# Patient Record
Sex: MALE | Race: OTHER | NOT HISPANIC OR LATINO | URBAN - METROPOLITAN AREA
[De-identification: names, ages, dates, MRNs, and addresses within clinical notes are randomized per-mention and may not be internally consistent; named-entity substitution may affect disease eponyms.]

---

## 2020-11-23 ENCOUNTER — INPATIENT (INPATIENT)
Facility: HOSPITAL | Age: 60
LOS: 0 days | Discharge: LEFT BEFORE DISCHARGE | DRG: 432 | End: 2020-11-24
Attending: STUDENT IN AN ORGANIZED HEALTH CARE EDUCATION/TRAINING PROGRAM | Admitting: HOSPITALIST
Payer: MEDICARE

## 2020-11-23 VITALS
WEIGHT: 169.98 LBS | TEMPERATURE: 98 F | OXYGEN SATURATION: 100 % | HEART RATE: 108 BPM | SYSTOLIC BLOOD PRESSURE: 130 MMHG | DIASTOLIC BLOOD PRESSURE: 56 MMHG | HEIGHT: 66 IN | RESPIRATION RATE: 17 BRPM

## 2020-11-23 DIAGNOSIS — I81 PORTAL VEIN THROMBOSIS: ICD-10-CM

## 2020-11-23 DIAGNOSIS — R16.0 HEPATOMEGALY, NOT ELSEWHERE CLASSIFIED: ICD-10-CM

## 2020-11-23 DIAGNOSIS — I10 ESSENTIAL (PRIMARY) HYPERTENSION: ICD-10-CM

## 2020-11-23 DIAGNOSIS — K70.31 ALCOHOLIC CIRRHOSIS OF LIVER WITH ASCITES: ICD-10-CM

## 2020-11-23 DIAGNOSIS — D64.9 ANEMIA, UNSPECIFIED: ICD-10-CM

## 2020-11-23 DIAGNOSIS — Z29.9 ENCOUNTER FOR PROPHYLACTIC MEASURES, UNSPECIFIED: ICD-10-CM

## 2020-11-23 LAB
ALBUMIN SERPL ELPH-MCNC: 2.8 G/DL — LOW (ref 3.3–5)
ALP SERPL-CCNC: 159 U/L — HIGH (ref 40–120)
ALT FLD-CCNC: 47 U/L — HIGH (ref 10–45)
AMMONIA BLD-MCNC: <10 UMOL/L — LOW (ref 11–55)
ANION GAP SERPL CALC-SCNC: 9 MMOL/L — SIGNIFICANT CHANGE UP (ref 5–17)
APPEARANCE UR: CLEAR — SIGNIFICANT CHANGE UP
APTT BLD: 30.4 SEC — SIGNIFICANT CHANGE UP (ref 27.5–35.5)
AST SERPL-CCNC: 111 U/L — HIGH (ref 10–40)
BACTERIA # UR AUTO: PRESENT /HPF
BASOPHILS # BLD AUTO: 0.02 K/UL — SIGNIFICANT CHANGE UP (ref 0–0.2)
BASOPHILS NFR BLD AUTO: 0.5 % — SIGNIFICANT CHANGE UP (ref 0–2)
BILIRUB SERPL-MCNC: 1.7 MG/DL — HIGH (ref 0.2–1.2)
BILIRUB UR-MCNC: ABNORMAL
BUN SERPL-MCNC: 14 MG/DL — SIGNIFICANT CHANGE UP (ref 7–23)
CALCIUM SERPL-MCNC: 8.3 MG/DL — LOW (ref 8.4–10.5)
CHLORIDE SERPL-SCNC: 100 MMOL/L — SIGNIFICANT CHANGE UP (ref 96–108)
CO2 SERPL-SCNC: 27 MMOL/L — SIGNIFICANT CHANGE UP (ref 22–31)
COLOR SPEC: YELLOW — SIGNIFICANT CHANGE UP
CREAT SERPL-MCNC: 0.75 MG/DL — SIGNIFICANT CHANGE UP (ref 0.5–1.3)
DIFF PNL FLD: NEGATIVE — SIGNIFICANT CHANGE UP
EOSINOPHIL # BLD AUTO: 0.2 K/UL — SIGNIFICANT CHANGE UP (ref 0–0.5)
EOSINOPHIL NFR BLD AUTO: 5 % — SIGNIFICANT CHANGE UP (ref 0–6)
EPI CELLS # UR: ABNORMAL /HPF (ref 0–5)
GLUCOSE SERPL-MCNC: 114 MG/DL — HIGH (ref 70–99)
GLUCOSE UR QL: NEGATIVE — SIGNIFICANT CHANGE UP
GRAM STN FLD: SIGNIFICANT CHANGE UP
HCT VFR BLD CALC: 27.5 % — LOW (ref 39–50)
HGB BLD-MCNC: 8.5 G/DL — LOW (ref 13–17)
IMM GRANULOCYTES NFR BLD AUTO: 0.5 % — SIGNIFICANT CHANGE UP (ref 0–1.5)
INR BLD: 1.28 — HIGH (ref 0.88–1.16)
KETONES UR-MCNC: ABNORMAL MG/DL
LACTATE SERPL-SCNC: 1.6 MMOL/L — SIGNIFICANT CHANGE UP (ref 0.5–2)
LEUKOCYTE ESTERASE UR-ACNC: NEGATIVE — SIGNIFICANT CHANGE UP
LIDOCAIN IGE QN: 21 U/L — SIGNIFICANT CHANGE UP (ref 7–60)
LYMPHOCYTES # BLD AUTO: 0.46 K/UL — LOW (ref 1–3.3)
LYMPHOCYTES # BLD AUTO: 11.6 % — LOW (ref 13–44)
MCHC RBC-ENTMCNC: 23 PG — LOW (ref 27–34)
MCHC RBC-ENTMCNC: 30.9 GM/DL — LOW (ref 32–36)
MCV RBC AUTO: 74.3 FL — LOW (ref 80–100)
MONOCYTES # BLD AUTO: 0.46 K/UL — SIGNIFICANT CHANGE UP (ref 0–0.9)
MONOCYTES NFR BLD AUTO: 11.6 % — SIGNIFICANT CHANGE UP (ref 2–14)
NEUTROPHILS # BLD AUTO: 2.82 K/UL — SIGNIFICANT CHANGE UP (ref 1.8–7.4)
NEUTROPHILS NFR BLD AUTO: 70.8 % — SIGNIFICANT CHANGE UP (ref 43–77)
NITRITE UR-MCNC: POSITIVE
NRBC # BLD: 0 /100 WBCS — SIGNIFICANT CHANGE UP (ref 0–0)
PH UR: 6 — SIGNIFICANT CHANGE UP (ref 5–8)
PLATELET # BLD AUTO: 66 K/UL — LOW (ref 150–400)
POTASSIUM SERPL-MCNC: 3.6 MMOL/L — SIGNIFICANT CHANGE UP (ref 3.5–5.3)
POTASSIUM SERPL-SCNC: 3.6 MMOL/L — SIGNIFICANT CHANGE UP (ref 3.5–5.3)
PROT SERPL-MCNC: 7.2 G/DL — SIGNIFICANT CHANGE UP (ref 6–8.3)
PROT UR-MCNC: ABNORMAL MG/DL
PROTHROM AB SERPL-ACNC: 15.2 SEC — HIGH (ref 10.6–13.6)
RBC # BLD: 3.7 M/UL — LOW (ref 4.2–5.8)
RBC # FLD: 21.4 % — HIGH (ref 10.3–14.5)
RBC CASTS # UR COMP ASSIST: < 5 /HPF — SIGNIFICANT CHANGE UP
SARS-COV-2 RNA SPEC QL NAA+PROBE: SIGNIFICANT CHANGE UP
SODIUM SERPL-SCNC: 136 MMOL/L — SIGNIFICANT CHANGE UP (ref 135–145)
SP GR SPEC: >=1.03 — SIGNIFICANT CHANGE UP (ref 1–1.03)
SPECIMEN SOURCE: SIGNIFICANT CHANGE UP
UROBILINOGEN FLD QL: 2 E.U./DL
WBC # BLD: 3.98 K/UL — SIGNIFICANT CHANGE UP (ref 3.8–10.5)
WBC # FLD AUTO: 3.98 K/UL — SIGNIFICANT CHANGE UP (ref 3.8–10.5)
WBC UR QL: < 5 /HPF — SIGNIFICANT CHANGE UP

## 2020-11-23 PROCEDURE — 76705 ECHO EXAM OF ABDOMEN: CPT | Mod: 26

## 2020-11-23 PROCEDURE — 99223 1ST HOSP IP/OBS HIGH 75: CPT | Mod: GC

## 2020-11-23 PROCEDURE — 72100 X-RAY EXAM L-S SPINE 2/3 VWS: CPT | Mod: 26

## 2020-11-23 PROCEDURE — 99285 EMERGENCY DEPT VISIT HI MDM: CPT

## 2020-11-23 PROCEDURE — 74177 CT ABD & PELVIS W/CONTRAST: CPT | Mod: 26

## 2020-11-23 PROCEDURE — 49083 ABD PARACENTESIS W/IMAGING: CPT

## 2020-11-23 RX ORDER — MORPHINE SULFATE 50 MG/1
4 CAPSULE, EXTENDED RELEASE ORAL ONCE
Refills: 0 | Status: DISCONTINUED | OUTPATIENT
Start: 2020-11-23 | End: 2020-11-23

## 2020-11-23 RX ORDER — HYDROMORPHONE HYDROCHLORIDE 2 MG/ML
0.5 INJECTION INTRAMUSCULAR; INTRAVENOUS; SUBCUTANEOUS ONCE
Refills: 0 | Status: DISCONTINUED | OUTPATIENT
Start: 2020-11-23 | End: 2020-11-23

## 2020-11-23 RX ORDER — FOLIC ACID 0.8 MG
1 TABLET ORAL DAILY
Refills: 0 | Status: DISCONTINUED | OUTPATIENT
Start: 2020-11-23 | End: 2020-11-24

## 2020-11-23 RX ORDER — BENZOCAINE AND MENTHOL 5; 1 G/100ML; G/100ML
1 LIQUID ORAL ONCE
Refills: 0 | Status: COMPLETED | OUTPATIENT
Start: 2020-11-23 | End: 2020-11-23

## 2020-11-23 RX ORDER — THIAMINE MONONITRATE (VIT B1) 100 MG
100 TABLET ORAL DAILY
Refills: 0 | Status: DISCONTINUED | OUTPATIENT
Start: 2020-11-23 | End: 2020-11-24

## 2020-11-23 RX ORDER — IOHEXOL 300 MG/ML
30 INJECTION, SOLUTION INTRAVENOUS ONCE
Refills: 0 | Status: COMPLETED | OUTPATIENT
Start: 2020-11-23 | End: 2020-11-23

## 2020-11-23 RX ORDER — TRAMADOL HYDROCHLORIDE 50 MG/1
50 TABLET ORAL ONCE
Refills: 0 | Status: DISCONTINUED | OUTPATIENT
Start: 2020-11-23 | End: 2020-11-23

## 2020-11-23 RX ORDER — ALBUMIN HUMAN 25 %
100 VIAL (ML) INTRAVENOUS ONCE
Refills: 0 | Status: COMPLETED | OUTPATIENT
Start: 2020-11-23 | End: 2020-11-23

## 2020-11-23 RX ADMIN — Medication 100 MILLIGRAM(S): at 17:00

## 2020-11-23 RX ADMIN — MORPHINE SULFATE 4 MILLIGRAM(S): 50 CAPSULE, EXTENDED RELEASE ORAL at 06:25

## 2020-11-23 RX ADMIN — Medication 50 MILLILITER(S): at 17:14

## 2020-11-23 RX ADMIN — Medication 1 TABLET(S): at 17:00

## 2020-11-23 RX ADMIN — TRAMADOL HYDROCHLORIDE 50 MILLIGRAM(S): 50 TABLET ORAL at 22:49

## 2020-11-23 RX ADMIN — Medication 1 MILLIGRAM(S): at 17:00

## 2020-11-23 RX ADMIN — BENZOCAINE AND MENTHOL 1 LOZENGE: 5; 1 LIQUID ORAL at 20:00

## 2020-11-23 RX ADMIN — IOHEXOL 30 MILLILITER(S): 300 INJECTION, SOLUTION INTRAVENOUS at 06:25

## 2020-11-23 RX ADMIN — HYDROMORPHONE HYDROCHLORIDE 0.5 MILLIGRAM(S): 2 INJECTION INTRAMUSCULAR; INTRAVENOUS; SUBCUTANEOUS at 08:38

## 2020-11-23 RX ADMIN — HYDROMORPHONE HYDROCHLORIDE 0.5 MILLIGRAM(S): 2 INJECTION INTRAMUSCULAR; INTRAVENOUS; SUBCUTANEOUS at 08:00

## 2020-11-23 RX ADMIN — MORPHINE SULFATE 4 MILLIGRAM(S): 50 CAPSULE, EXTENDED RELEASE ORAL at 07:23

## 2020-11-23 RX ADMIN — HYDROMORPHONE HYDROCHLORIDE 0.5 MILLIGRAM(S): 2 INJECTION INTRAMUSCULAR; INTRAVENOUS; SUBCUTANEOUS at 06:57

## 2020-11-23 NOTE — ED PROVIDER NOTE - INPATIENT RESIDENT/ACP NOTIFIED DATE
As we discussed, the staples did not take. Therefore, you have an open wound that needs to heal.  You should try to follow-up with a plastic surgeon in 3 or 4 days for further evaluation of that wound. Please return with any fevers, swelling, drainage, or worsening symptoms, or additional concerns. 23-Nov-2020 13:29

## 2020-11-23 NOTE — H&P ADULT - NSHPSOCIALHISTORY_GEN_ALL_CORE
Lives with both sister and son. Used to work as a , no longer works anymore. Smokes 7 cigarettes daily for past 40 years. Drinks 3-4 beers a day, cut down from 6 a day 1 month ago. Used to utilize heroin a year ago.

## 2020-11-23 NOTE — H&P ADULT - PROBLEM SELECTOR PLAN 3
Hg 8.5, HCT 27.5 on admission, with no known baseline. Patient denies any signs of active bleeding (no melena, no hematochezia, no BRBPR). He is asymptomatic currently, but will closely monitor given paraseophageal varices.   -Follow up iron studies  -maintain active t and s  -transfuse < 7 On CT, patient has evidence of portal vein thrombosis.  -will obtain doppler US to further workup.

## 2020-11-23 NOTE — CHART NOTE - NSCHARTNOTEFT_GEN_A_CORE
As per records obtained from [facility], patient has had MRI at [place] on 10/4 and was found to have unresectable HCC with PV thrombosis. CT chest done at that time with no evidence of metastatic disease. He also has history of HCV that was treated with Vosevi but currently in relapse. He is no longer a candidate for liver transplant due to continued drinking. At [place], he underwent diagnostic and therapeutic paracentesis that was negative for SBP. AFP during that admission was 345.

## 2020-11-23 NOTE — CONSULT NOTE ADULT - SUBJECTIVE AND OBJECTIVE BOX
GASTROENTEROLOGY CONSULT NOTE  HPI:  Patient is a 60 year old male with medical history significant for HTN, GERD, alcohol abuse, heroin abuse, and recently diagnosed cirrhosis here with a two day history of severe RLQ abd pain, nausea, and increased abdominal girth. Patient is a very poor historian, but he notes that he started to experience abdominal discomfort, and increasing size of his abdomen about 5-6 weeks ago, and at that time went to see PCP Dr. Danny Gould in the [place], when he was told that he has cirrhosis and ascites (water in his abdomen). He then went to [facility] for paracentesis and further management of his ascites and presumable workup of Cirrhosis. Unfortunately, he does not remember the workup that was done there so collateral information needs to be obtained from [place]. He notes that after discharge from [place], about a 1 week ago, he felt fine and abd tenderness was minimal; however, two days ago he started to again experience intense RLQ pain, loose stools, nausea, and increasing abdominal size. He is an active drinker still, and notes that he drinks about 3 beers daily, which is cut down from a month ago, when he would consume a 6 pack of beer daily. He notes his last drink was Friday. He has never withdrawn before. He denies shortness of breath, chest pain, fevers, vomiting, melena, hematochezia, BRBPR, urinary symptoms, lightheadedness, dizzyness.  (23 Nov 2020 14:06)    Allergies    No Known Allergies    Intolerances      Home Medications:  Lexapro 5 mg oral tablet: 2 tab(s) orally once a day (23 Nov 2020 14:29)  lisinopril 10 mg oral tablet: 1 tab(s) orally once a day (23 Nov 2020 14:28)    MEDICATIONS:  MEDICATIONS  (STANDING):  folic acid 1 milliGRAM(s) Oral daily  multivitamin 1 Tablet(s) Oral daily  thiamine 100 milliGRAM(s) Oral daily  traMADol 50 milliGRAM(s) Oral once    MEDICATIONS  (PRN):    PAST MEDICAL & SURGICAL HISTORY:  Cirrhosis    Hypertension    No significant past surgical history      FAMILY HISTORY:  FH: type 2 diabetes    FH: hypertension      SOCIAL HISTORY:  As above    REVIEW OF SYSTEMS:  CONSTITUTIONAL: No weakness, fevers or chills  HEENT: No visual changes; No vertigo or throat pain   NECK: No pain or stiffness  RESPIRATORY: No cough, wheezing, hemoptysis; No shortness of breath  CARDIOVASCULAR: No chest pain or palpitations  GASTROINTESTINAL: Abdominal pain and distention  GENITOURINARY: No dysuria, frequency or hematuria  NEUROLOGICAL: No numbness or weakness  SKIN: No itching, burning, rashes, or lesions   All other 10 review of systems is negative unless indicated above.    Vital Signs Last 24 Hrs  T(C): 36.7 (23 Nov 2020 12:40), Max: 36.8 (23 Nov 2020 08:24)  T(F): 98.1 (23 Nov 2020 12:40), Max: 98.3 (23 Nov 2020 08:24)  HR: 94 (23 Nov 2020 12:40) (94 - 108)  BP: 142/81 (23 Nov 2020 12:40) (118/76 - 142/81)  BP(mean): --  RR: 18 (23 Nov 2020 12:40) (16 - 18)  SpO2: 98% (23 Nov 2020 12:40) (97% - 100%)      PHYSICAL EXAM:    General: Obese, NAD  Eyes: Anicteric sclerae, moist conjunctivae  HENT: Moist mucous membranes  Neck: Trachea midline, supple  Lungs: Normal respiratory effort, no intercostal retractions  Cardiovascular: RRR  Abdomen: Distended, dull to tympany, tender across the lower abdomen  Extremities: Normal range of motion, No clubbing, cyanosis or edema  Neurological: Alert and oriented x3  Skin: Warm and dry. No obvious rash    LABS:                        8.5    3.98  )-----------( 66       ( 23 Nov 2020 06:10 )             27.5     11-23    136  |  100  |  14  ----------------------------<  114<H>  3.6   |  27  |  0.75    Ca    8.3<L>      23 Nov 2020 06:10    TPro  7.2  /  Alb  2.8<L>  /  TBili  1.7<H>  /  DBili  0.8<H>  /  AST  111<H>  /  ALT  47<H>  /  AlkPhos  159<H>  11-23        PT/INR - ( 23 Nov 2020 06:10 )   PT: 15.2 sec;   INR: 1.28          PTT - ( 23 Nov 2020 06:10 )  PTT:30.4 sec    RADIOLOGY & ADDITIONAL STUDIES:     Reviewed

## 2020-11-23 NOTE — H&P ADULT - ASSESSMENT
Patient is a 60 year old male with medical history significant for HTN, GERD, alcohol abuse, heroin abuse, and recently diagnosed cirrhosis here with a two day history of severe RLQ abd pain, nausea, and increased abdominal girth admitted for paracentesis and further workup of newly found 3.8 cm liver mass.

## 2020-11-23 NOTE — ED PROVIDER NOTE - CLINICAL SUMMARY MEDICAL DECISION MAKING FREE TEXT BOX
Pt with diffuse abd pain, hx of recent dx of cirrhosis, vitals stable, pe - abd distention with periumbilical tenderness, abdominal labs ordered including ammonia, ct ordered, morphine given for pain.  Signed out to day team Dr. Plascencia and CHANELLE Dasilva pending labs, ct, reevaluation.

## 2020-11-23 NOTE — ED PROVIDER NOTE - OBJECTIVE STATEMENT
61 y/o m poor historian with PMH of alcohol abuse for many years (quit 2 days prior), prior heroin abuse (quit 2 years prior), ? recent diagnosis of cirrhosis several weeks prior s/p first paracentesis, no hx of abd surgeries, HTN, GERD now presents with diffuse abd pain , distention, several loose bowel movements, no vomiting, no fever, no chills, no urinary symptoms.

## 2020-11-23 NOTE — DISCHARGE NOTE PROVIDER - NSDCMRMEDTOKEN_GEN_ALL_CORE_FT
Lexapro 5 mg oral tablet: 2 tab(s) orally once a day  lisinopril 10 mg oral tablet: 1 tab(s) orally once a day   ferrous sulfate 325 mg (65 mg elemental iron) oral tablet: 1 tab(s) orally once a day  folic acid 1 mg oral tablet: 1 tab(s) orally once a day  Lasix 40 mg oral tablet: 1 tab(s) orally once a day  Lexapro 5 mg oral tablet: 2 tab(s) orally once a day  lisinopril 10 mg oral tablet: 1 tab(s) orally once a day  Multiple Vitamins oral tablet: 1 tab(s) orally once a day  spironolactone 50 mg oral tablet: orally once a day

## 2020-11-23 NOTE — H&P ADULT - NSHPLABSRESULTS_GEN_ALL_CORE
LABS:                         8.5    3.98  )-----------( 66       ( 23 Nov 2020 06:10 )             27.5     11-23    136  |  100  |  14  ----------------------------<  114<H>  3.6   |  27  |  0.75    Ca    8.3<L>      23 Nov 2020 06:10    TPro  7.2  /  Alb  2.8<L>  /  TBili  1.7<H>  /  DBili  0.8<H>  /  AST  111<H>  /  ALT  47<H>  /  AlkPhos  159<H>  11-23    PT/INR - ( 23 Nov 2020 06:10 )   PT: 15.2 sec;   INR: 1.28          PTT - ( 23 Nov 2020 06:10 )  PTT:30.4 sec  Urinalysis Basic - ( 23 Nov 2020 06:39 )    Color: Yellow / Appearance: Clear / SG: >=1.030 / pH: x  Gluc: x / Ketone: Trace mg/dL  / Bili: Small / Urobili: 2.0 E.U./dL   Blood: x / Protein: Trace mg/dL / Nitrite: POSITIVE   Leuk Esterase: NEGATIVE / RBC: < 5 /HPF / WBC < 5 /HPF   Sq Epi: x / Non Sq Epi: 5-10 /HPF / Bacteria: Present /HPF            Lactate, Blood: 1.6 mmol/L (11-23 @ 07:19)    < from: CT Abdomen and Pelvis w/ Oral Cont and w/ IV Cont (11.23.20 @ 08:02) >    mpression:    1. Cirrhotic liver and evidence of portal hypertension with large ascites, mild splenomegaly and paraesophageal varices.    2. 3.8 cm centrally calcified liver mass, concerning for hepatocellular carcinoma. Recommend correlation with prior history and possible treatments. Recommend follow-up MRI.    3. Hypodense tubular structure in the anterior segment of the right hepatic lobe may represent thrombosed branch of portal vein or dilated bile duct. Recommend follow-up MRI.    4. Nodular enhancing structures in the retroperitoneal space representing lymphadenopathy or varicose veins.    < end of copied text >

## 2020-11-23 NOTE — H&P ADULT - PROBLEM SELECTOR PLAN 4
Patient on Lisinopril 10mg at home for hypertension. Currently he is normotensive.   -Will monitor and reassess need once paracentesis is done. Hg 8.5, HCT 27.5 on admission, with no known baseline. Patient denies any signs of active bleeding (no melena, no hematochezia, no BRBPR). He is asymptomatic currently, but will closely monitor given paraseophageal varices.   -Follow up iron studies  -maintain active t and s  -transfuse < 7

## 2020-11-23 NOTE — ED PROVIDER NOTE - PROGRESS NOTE DETAILS
Ree: pt received from Dr. Sinclair at [facility]; pt with recent dx of cirrhosis and ascites, s/p paracentesis 2 wks ago, here with severe abd pain. Abd distended, with ttp to periumbilical region. + anemia, thrombocytopenia, elevated lfts on labs. Awaiting ct a/p. Pt just given dilaudid and pain improved. Will continue to monitor. Dispo pending ct results and pt re-assessment.

## 2020-11-23 NOTE — ED ADULT TRIAGE NOTE - CHIEF COMPLAINT QUOTE
Pt presents via EMS with c/o "abdominal pain" x 2 days accompanied by nausea. Denies any V/D. Abdomen distended; pt reports recent paracentesis and hx of liver failure.

## 2020-11-23 NOTE — ED ADULT NURSE NOTE - OBJECTIVE STATEMENT
presents to ED w/ c/o 2 days worsening abdominal distention and pain , hx ascites drained approx 1 week ago at outside hospital. pt w/ jaundiced skin and writhing on stretcher in pain. denies CP/SOB/difficulty breathing

## 2020-11-23 NOTE — H&P ADULT - ATTENDING COMMENTS
# Cirrhosis with ascites   # Abdominal pain   - IR for diagnostic and therapeutic paracentesis today. Please f/u ascitic fluid cell count tonight. Start Ceftriaxone if PMN>250  - CT Abdomen pelvis from this admission with equivocal read re: portal vein thrombosis(unclear if portal vein thrombosis present); Ordered RUQ US with dopplers.   In the afternoon, GI fellow obtained verbal signout from fellow at [facility] that patient has HCC and portal vein thrombosis (found during last admission to [facility] ~2.5 weeks ago) However, patient is not on AC. Possible that physicians at [facility] reached decision to not start AC after discussing risks/benefits with patient (patient does have paraesophageal varices seen on CT abd from this admission). However, given that paraesophageal varices are not an absolute contra-indication to AC, and patient denies ever having emesis, would get paper records from [facility] to find out reasoning behind no AC. Given that patient has had most of his care at [facility] hepatology (was on transplant list, then taken off ) would defer decision making re: anti-coagulation to his outpatient hepatology providers  - Start spironolactone/furosemide (patient was supposed to start taking, per GI fellow signout, but patient says he has not been taking it) at [id number] or other ratio recommended by his outpatient GI 60 year old man with cirrhosis, reported history of HCV, recent hospitalization     # Cirrhosis with ascites   # Abdominal pain   - IR for diagnostic and therapeutic paracentesis today. Please f/u ascitic fluid cell count tonight. Start Ceftriaxone if PMN>250  - CT Abdomen pelvis from this admission with equivocal read re: portal vein thrombosis(unclear if portal vein thrombosis present); Ordered RUQ US with dopplers.   In the afternoon, GI fellow obtained verbal signout from fellow at [facility] Hep[facility]ology that patient has HCC and portal vein thrombosis (found during admission to [facility] on October 4th 2020) However, patient is not on AC. Patient was last seen at [facility] clinic on 11/9/2020. Highly likely that physicians at [facility] reached decision to not start AC after discussing risks/benefits with patient (patient does have paraesophageal varices on CT abd from this admission). However, given that paraesophageal varices are not an absolute contra-indication to AC, and patient denies ever having hematemesis, would get paper records from [facility] to confirm that he is not supposed to be on AC. Given that patient gets most of his care at [facility] hepatology (was on transplant list, then taken off for alc use; might get radioablation of liver lesion) would defer decision making re: anti-coagulation to his outpatient hepatology providers  [ ] Continue holding lisinopril (unclear if he is still supposed to be on this, please confirm with [facility] records; usually ACE/ARB are discontinued in ascites.  - Start spironolactone/furosemide (patient was supposed to start taking, per GI fellow signout, but patient says he has not been taking it). Usually, would start at [id number] but will defer to his outpatient hepatologist re: preferred regimen.     # Hepatocellular Carcinoma   # Cirrhosis   Was taken off the transplant list at [facility] for relapse in alcohol use.   However, given that abstention from alcohol is no longer a precondition for transplant (policy re: alcohol use is decided arbitrarily by each transplant center; e.g. alcohol use is not a contra-indication to transplant at [facility]) patient could consider getting a second opinion at a Transplant center that does not require 6 months of abstention from alcohol. Counseled patient on this option when I spoke with him at bedside today. Recommended that patient resume care with [facility], knowing that he has other options re: transplant programs.    [ ] Please help patient with making a follow-up appointment with [facility] hepatology clinic (patient has an appointment for tomorrow, and may miss it if he remains in the hospital). 60 year old man with cirrhosis, reported history of HCV, recently diagnosed HCC, Portal vein thrombosis (both dx-ed at [facility] in October 2020), hx of heroin use ,alcohol use disorder, presenting with abdominal pain,     Gets most of his care at [facility]. Says he came here today because EMS offered to take him to [facility], and he said "Sure!"    # Cirrhosis with ascites   # Abdominal pain   - IR for diagnostic and therapeutic paracentesis today. Please f/u ascitic fluid cell count tonight. Start Ceftriaxone if PMN>250  - CT Abdomen pelvis from this admission with equivocal read re: portal vein thrombosis(unclear if portal vein thrombosis present); Ordered RUQ US with dopplers.   In the afternoon, GI fellow obtained verbal signout from fellow at [facility] Hep[facility]ology that patient has HCC and portal vein thrombosis (found during admission to [facility] on October 4th 2020) However, patient is not on AC. Patient was last seen at [facility] clinic on 11/9/2020. Highly likely that physicians at [facility] reached decision to not start AC after discussing risks/benefits with patient (patient does have paraesophageal varices on CT abd from this admission). However, given that paraesophageal varices are not an absolute contra-indication to AC, and patient denies ever having hematemesis, would get paper records from [facility] to confirm that he is not supposed to be on AC. Given that patient gets most of his care at [facility] hepatology (was on transplant list, then taken off for alc use; might get radioablation of liver lesion) would defer decision making re: anti-coagulation to his outpatient hepatology providers  [ ] Continue holding lisinopril (unclear if he is still supposed to be on this, please confirm with [facility] records; usually ACE/ARB are discontinued in ascites.  - Start spironolactone/furosemide (patient was supposed to start taking, per GI fellow signout, but patient says he has not been taking it). Usually, would start at [id number] but will defer to his outpatient hepatologist re: preferred regimen.     # Hepatocellular Carcinoma   # Cirrhosis   Was taken off the transplant list at [facility] for relapse in alcohol use.   However, given that abstention from alcohol is no longer a precondition for transplant (policy re: alcohol use is decided arbitrarily by each transplant center; e.g. alcohol use is not a contra-indication to transplant at [facility]) patient could consider getting a second opinion at a Transplant center that does not require 6 months of abstention from alcohol. Counseled patient on this option when I spoke with him at bedside today. Recommended that patient resume care with [facility], knowing that he has other options re: transplant programs.    [ ] Please help patient with making a follow-up appointment with [facility] hepatology clinic (patient has an appointment for tomorrow, and may miss it if he remains in the hospital).

## 2020-11-23 NOTE — H&P ADULT - PROBLEM SELECTOR PLAN 5
F: none  E: replete as needed  N: will place patient on diet after IR procedure  DVT: none in light of anemia, SCD Patient on Lisinopril 10mg at home for hypertension. Currently he is normotensive.   -Will monitor and reassess need once paracentesis is done. Patient on Lisinopril 10mg at home for hypertension. Currently he is normotensive.   -please discontinue lisinopril 10mg on discharge in light of cirrhosis and ascites.  -as per records obtained from [place], patient was discharged on lasix 40 and spironolactone 50, will continue this on discharge.

## 2020-11-23 NOTE — H&P ADULT - PROBLEM SELECTOR PLAN 6
F: none  E: replete as needed  N: will place patient on diet after IR procedure  DVT: none in light of anemia, SCD

## 2020-11-23 NOTE — H&P ADULT - NSHPREVIEWOFSYSTEMS_GEN_ALL_CORE
REVIEW OF SYSTEMS:    CONSTITUTIONAL: No weakness, fevers or chills  EYES/ENT: No visual changes;  No vertigo or throat pain   NECK: No pain or stiffness  RESPIRATORY: No cough, wheezing, hemoptysis; No shortness of breath  CARDIOVASCULAR: No chest pain or palpitations  GASTROINTESTINAL: Abd pain, distension, nausea. Loose stools for past three days. No melena or hematochezia.  GENITOURINARY: No dysuria, frequency or hematuria  NEUROLOGICAL: No numbness or weakness  SKIN: No itching, burning, rashes, or lesions   All other review of systems is negative unless indicated above.

## 2020-11-23 NOTE — H&P ADULT - HISTORY OF PRESENT ILLNESS
Patient is a 60 year old male with medical history significant for HTN, GERD, alcohol abuse, heroin abuse, and recently diagnosed cirrhosis here with a two day history of severe RLQ abd pain, nausea, and increased abdominal girth. Patient is a very poor historian, but he notes that he started to experience abdominal discomfort, and increasing size of his abdomen about 5-6 weeks ago, and at that time went to see PCP Dr. Danny Gould in the [place], when he was told that he has cirrhosis and ascites (water in his abdomen). He then went to [facility] for paracentesis and further management of his ascites and presumable workup of Cirrhosis. Unfortunately, he does not remember the workup that was done there so collateral information needs to be obtained from [place]. He notes that after discharge from [place], about a 1 week ago, he felt fine and abd tenderness was minimal; however, two days ago he started to again experience intense RLQ pain, loose stools, nausea, and increasing abdominal size. He is an active drinker still, and notes that he drinks about 3 beers daily, which is cut down from a month ago, when he would consume a 6 pack of beer daily. He notes his last drink was Friday. He has never withdrawn before. He denies shortness of breath, chest pain, fevers, vomiting, melena, hematochezia, BRBPR, urinary symptoms, lightheadedness, dizzyness. Patient is a 60 year old male with medical history significant for HTN, GERD, alcohol abuse, heroin abuse, and recently diagnosed cirrhosis here with a two day history of severe RLQ abd pain, nausea, and increased abdominal girth. Patient is a very poor historian, but he notes that he started to experience abdominal discomfort, and increasing size of his abdomen about 5-6 weeks ago, and at that time went to see PCP Dr. Danny Gould in the [place], when he was told that he has cirrhosis and ascites (water in his abdomen). He then went to [facility] for paracentesis and further management of his ascites and presumable workup of Cirrhosis. Unfortunately, he does not remember the workup that was done there so collateral information needs to be obtained from [place]. He notes that after discharge from [place], about a 1 week ago, he felt fine and abd tenderness was minimal; however, two days ago he started to again experience intense RLQ pain, loose stools, nausea, and increasing abdominal size. He is an active drinker still, and notes that he drinks about 3 beers daily, which is cut down from a month ago, when he would consume a 6 pack of beer daily. He notes his last drink was Friday. He has never withdrawn before. He denies shortness of breath, chest pain, fevers, vomiting, melena, hematochezia, BRBPR, urinary symptoms, lightheadedness, dizzyness.   ED Vitals: T 98.3, HR 94, -140/90, RR 18 98% RA  ED Labs: WBC3.98, Hg 8.5, Hct 27.5, Plt 66, INR 1.28, Alb 2.8, Bili 1.7, D bili 0.8, alk phos 159, ast 111, alt 46, , negative UA, CT AP significant for:   1. Cirrhotic liver and evidence of portal hypertension with large ascites, mild splenomegaly and paraesophageal varices.  2. 3.8 cm centrally calcified liver mass, concerning for hepatocellular carcinoma. Recommend correlation with prior history and possible treatments. Recommend follow-up MRI.  3. Hypodense tubular structure in the anterior segment of the right hepatic lobe may represent thrombosed branch of portal vein or dilated bile duct. Recommend follow-up MRI.  4. Nodular enhancing structures in the retroperitoneal space representing lymphadenopathy or varicose veins  ED Course: Tramadol and Morphine for pain.

## 2020-11-23 NOTE — H&P ADULT - NSHPPHYSICALEXAM_GEN_ALL_CORE
.  VITAL SIGNS:  T(C): 36.7 (11-23-20 @ 12:40), Max: 36.8 (11-23-20 @ 08:24)  T(F): 98.1 (11-23-20 @ 12:40), Max: 98.3 (11-23-20 @ 08:24)  HR: 94 (11-23-20 @ 12:40) (94 - 108)  BP: 142/81 (11-23-20 @ 12:40) (118/76 - 142/81)  BP(mean): --  RR: 18 (11-23-20 @ 12:40) (16 - 18)  SpO2: 98% (11-23-20 @ 12:40) (97% - 100%)  Wt(kg): --    PHYSICAL EXAM:    Constitutional: WDWN resting comfortably in bed; NAD  Head: NC/AT  Eyes: PERRL, EOMI, clear conjunctiva, right eye pterygium.   ENT: poor dentition, no oropharyngeal erythema or exudates; MMM  Neck: supple; no JVD or thyromegaly  Respiratory: CTABL, wheezes noted in bilateral lung bases  Cardiac: +S1/S2; RRR; no M/R/G  Gastrointestinal: distended, hard abdomen with varices noted. tender to palpation in rlq and ruq. non tympanic.   Back: no CVAT B/L  Extremities: WWP, no clubbing or cyanosis; no peripheral edema  Vascular: 2+ radial, femoral, DP/PT pulses B/L  Dermatologic: skin warm, dry and intact; excoriated, hyperpigmented, raised pustular rash noted on right shin up to mid calf. no palmar erythema noted, no spider angiomata.   Lymphatic: no submandibular or cervical LAD  Neurologic: AAOx3; CNII-XII grossly intact; no focal deficits, no asterixis noted.   Psychiatric: affect and characteristics of appearance, verbalizations, behaviors are appropriate

## 2020-11-23 NOTE — CONSULT NOTE ADULT - ASSESSMENT
60yM w/HTN, GERD, EtOH and IVDU, recently diagnosed with cirrhosis, presenting with abdominal pain and distention. GI consulted for cirrhosis.    1. Decompensated cirrhosis (+ascites; -VH; ?HE) - Etiology likely related to EtOH and IVDU, though patient unsure if he has had full evaluation before. Recently hospitalized at [facility] for similar complaint. Unsure if he has had an upper endoscopy in the past. Imaging at [facility] concerning for mass ascites and a ~3cm liver lesion with central calcification concerning for HCC. MELD-Na of 14; Child-Espino class B. Patient with recent alcohol cessation with AST:ALT in 2:1 pattern, concerning for alcoholic hepatitis, though MDF 12.3 indicative of good prognosis.  - Obtain collateral from [facility] regarding prior work-up  - Please obtain lipid panel, A1c, Iron studies, Ceruloplasmin, Alpha 1 Antitrypsin, Hepatitis A, B, C, and E serologies, VANE, ASMA, AMA, Quantitative IgG  - Perform diagnostic/therapeutic paracentesis with appropriate Albumin replacement - please send cell count and fluid studies to evaluate for SBP  - Per outpatient medication record, recently prescribed Lasix 40mg PO QD; Spironolactone 50mg PO QD - would continue  - Daily MELD labs - CMP and Coags  - Obtain MRI vs Triple Phase CT to evaluate liver lesion  - Add on AFP to admission labs    Recommendations discussed with primary team  Case discussed with attending physician

## 2020-11-23 NOTE — DISCHARGE NOTE PROVIDER - NSDCCPCAREPLAN_GEN_ALL_CORE_FT
PRINCIPAL DISCHARGE DIAGNOSIS  Diagnosis: Cirrhosis  Assessment and Plan of Treatment: Cirrhosis is a late-stage liver disease in which healthy liver tissue is replaced with scar tissue and the liver is permanently damaged. Scar tissue keeps your liver from working properly. Common causes include alcohol abuse, hepatitis and nonalcoholic fatty liver disease.   The scar tissue blocks the flow of blood through the liver and slows the liver’s ability to process nutrients, hormones, drugs and natural toxins (poisons). It also reduces the production of proteins and other substances made by the liver. Cirrhosis eventually keeps the liver from working properly. Late-stage cirrhosis is life-threatening.  For your cirrhosis, we did a paracentesis of your abdomen to drain the fluid that is causing your abdominal distension. Please make sure you take the Lasix and Spironolactone daily, as this will prevent further buildup of fluid. Please also make sure to follow up with your PCP.      SECONDARY DISCHARGE DIAGNOSES  Diagnosis: HCC (hepatocellular carcinoma)  Assessment and Plan of Treatment: Hepatocellular Carcinoma is a cancer that starts in the liver as a result of chronic liver inflammation, and is most closely linked to chronic viral hepatitis infection (hepatitis B or C) or alcohol abuse.   Your liver mass was diagnosed last month at [facility] and was deemed to be unresectable. Unfortunately this means that there is not much that doctors can do for your cancer. Please make sure to follow up with your PCP on controlling symptoms and pain of your HCC.    Diagnosis: Portal vein thrombosis  Assessment and Plan of Treatment: Portal vein thrombosis is blockage or narrowing of the portal vein (the blood vessel that brings blood to the liver from the intestines) by a blood clot. Most people have no symptoms, but in some people, fluid accumulates in the abdomen, the spleen enlarges, and/or severe bleeding occurs in the esophagus.  We believe that your portal vein thrombosis is caused by the mass in your liver. Please make sure to follow up with your PCP for this condition.     PRINCIPAL DISCHARGE DIAGNOSIS  Diagnosis: Cirrhosis  Assessment and Plan of Treatment: Cirrhosis is a late-stage liver disease in which healthy liver tissue is replaced with scar tissue and the liver is permanently damaged. Scar tissue keeps your liver from working properly. Common causes include alcohol abuse, hepatitis and nonalcoholic fatty liver disease.   The scar tissue blocks the flow of blood through the liver and slows the liver’s ability to process nutrients, hormones, drugs and natural toxins (poisons). It also reduces the production of proteins and other substances made by the liver. Cirrhosis eventually keeps the liver from working properly. Late-stage cirrhosis is life-threatening.  For your cirrhosis, we did a paracentesis of your abdomen to drain the fluid that is causing your abdominal distension. Please make sure you take the Lasix and Spironolactone daily, as this will prevent further buildup of fluid. Please also make sure to follow up with your PCP.      SECONDARY DISCHARGE DIAGNOSES  Diagnosis: HCC (hepatocellular carcinoma)  Assessment and Plan of Treatment: Hepatocellular Carcinoma is a cancer that starts in the liver as a result of chronic liver inflammation, and is most closely linked to chronic viral hepatitis infection (hepatitis B or C) or alcohol abuse.  Your liver mass was diagnosed last month at [facility] and was deemed to be unresectable. Unfortunately this means that there is not much that doctors can do for your cancer. Please make sure to follow up with your PCP on controlling symptoms and pain of your HCC.    Diagnosis: Portal vein thrombosis  Assessment and Plan of Treatment: Portal vein thrombosis is blockage or narrowing of the portal vein (the blood vessel that brings blood to the liver from the intestines) by a blood clot. Most people have no symptoms, but in some people, fluid accumulates in the abdomen, the spleen enlarges, and/or severe bleeding occurs in the esophagus.  We believe that your portal vein thrombosis is caused by the mass in your liver. Please make sure to follow up with your PCP for this condition.     PRINCIPAL DISCHARGE DIAGNOSIS  Diagnosis: Cirrhosis  Assessment and Plan of Treatment: Cirrhosis is a late-stage liver disease in which healthy liver tissue is replaced with scar tissue and the liver is permanently damaged. Scar tissue keeps your liver from working properly. Common causes include alcohol abuse, hepatitis and nonalcoholic fatty liver disease.   The scar tissue blocks the flow of blood through the liver and slows the liver’s ability to process nutrients, hormones, drugs and natural toxins (poisons). It also reduces the production of proteins and other substances made by the liver. Cirrhosis eventually keeps the liver from working properly. Late-stage cirrhosis is life-threatening.  For your cirrhosis, we did a paracentesis of your abdomen to drain the fluid that is causing your abdominal distension. Please make sure you take the Lasix and Spironolactone daily, as this will prevent further buildup of fluid. Please also make sure to follow up with your PCP.      SECONDARY DISCHARGE DIAGNOSES  Diagnosis: Cirrhosis of liver with ascites  Assessment and Plan of Treatment: The cirrhosis in your liver has caused fluid to accumulate in your abdomen, known as ascites  For this, [facility] prescribed Lasix 40 mg daily and spironolactone 50 mg daily to help with decreasing the amount of fluid in your abdomen.  You have informed us that you have adequate Lasix and Spironolactone at home and do not require refills  For confirmation, we called [facility]'s pharmacy and you are due for a refill on December 11th.  Please follow-up with Dr. Roland or [facility] GI outpatient.    Diagnosis: Benign essential HTN  Assessment and Plan of Treatment: Your PCP prescribed you lisinopril 10 mg for your high blood pressure. Your blood pressure was normal in the hospital and we did not start this medication here. Please monitor your blood pressure and home, and take lisinopril 10 mg,  and follow-up with your PCP.    Diagnosis: HCC (hepatocellular carcinoma)  Assessment and Plan of Treatment: Hepatocellular Carcinoma is a cancer that starts in the liver as a result of chronic liver inflammation, and is most closely linked to chronic viral hepatitis infection (hepatitis B or C) or alcohol abuse.  Your liver mass was diagnosed last month at [facility] and was deemed to be unresectable. Unfortunately this means that there is not much that doctors can do for your cancer. Please make sure to follow up with your PCP on controlling symptoms and pain of your HCC.    Diagnosis: Portal vein thrombosis  Assessment and Plan of Treatment: Portal vein thrombosis is blockage or narrowing of the portal vein (the blood vessel that brings blood to the liver from the intestines) by a blood clot. Most people have no symptoms, but in some people, fluid accumulates in the abdomen, the spleen enlarges, and/or severe bleeding occurs in the esophagus.  We believe that your portal vein thrombosis is caused by the mass in your liver. Please make sure to follow up with your PCP for this condition.

## 2020-11-23 NOTE — DISCHARGE NOTE PROVIDER - CARE PROVIDER_API CALL
Brent Roland  GASTROENTEROLOGY  [address] [address]  Phone: [phone]  Fax: [phone]  Follow Up Time: Routine

## 2020-11-23 NOTE — CONSULT NOTE ADULT - SUBJECTIVE AND OBJECTIVE BOX
Patient is a 60 year old male with medical history significant for HTN, GERD, alcohol abuse, heroin abuse, and recently diagnosed cirrhosis here with a two day history of severe RLQ abd pain, nausea, and increased abdominal girth. Patient is a very poor historian, but he notes that he started to experience abdominal discomfort, and increasing size of his abdomen about 5-6 weeks ago, and at that time went to see PCP Dr. Danny Gould in the [place], when he was told that he has cirrhosis and ascites (water in his abdomen). He then went to [facility] for paracentesis and further management of his ascites. Had paracentesis performed 2 weeks ago.         Clinical History: FH: type 2 diabetes    FH: hypertension    Handoff    MEWS Score    Cirrhosis    Hypertension    Abdominal pain    Portal vein thrombosis    Preventive measure    Hypertension    Anemia    Liver mass    Alcoholic cirrhosis of liver with ascites    No significant past surgical history    ABD PAIN    SysAdmin_VstLnk        Meds:folic acid 1 milliGRAM(s) Oral daily  multivitamin 1 Tablet(s) Oral daily  thiamine 100 milliGRAM(s) Oral daily  traMADol 50 milliGRAM(s) Oral once      Allergies:No Known Allergies        Labs:                           8.5    3.98  )-----------( 66       ( 23 Nov 2020 06:10 )             27.5     PT/INR - ( 23 Nov 2020 06:10 )   PT: 15.2 sec;   INR: 1.28          PTT - ( 23 Nov 2020 06:10 )  PTT:30.4 sec  11-23    136  |  100  |  14  ----------------------------<  114<H>  3.6   |  27  |  0.75    Ca    8.3<L>      23 Nov 2020 06:10    TPro  7.2  /  Alb  2.8<L>  /  TBili  1.7<H>  /  DBili  0.8<H>  /  AST  111<H>  /  ALT  47<H>  /  AlkPhos  159<H>  11-23        Image findings: large volume ascites

## 2020-11-23 NOTE — DISCHARGE NOTE PROVIDER - HOSPITAL COURSE
INCOMPLETE DO NOT DELETE  Patient is a 60 year old male with medical history significant for HTN, GERD, alcohol abuse, heroin abuse, and recently diagnosed cirrhosis here with a two day history of severe RLQ abd pain, nausea, and increased abdominal girth. Patient is a very poor historian, but he notes that he started to experience abdominal discomfort, and increasing size of his abdomen about 5-6 weeks ago, and at that time went to see PCP Dr. Danny Gould in the [place], when he was told that he has cirrhosis and ascites (water in his abdomen). He then went to [facility] for paracentesis and further management of his ascites and presumable workup of Cirrhosis. As per records obtained from [place], he was diagnosed with unresectable HCC and has active HCV for which he has failed therapy. He denies shortness of breath, chest pain, fevers, vomiting, melena, hematochezia, BRBPR, urinary symptoms, lightheadedness, dizzyness. In the ED, he underwent CT AP that demonstrated cirrhosis, para-esophageal varices, and a 3.8 cm liver mass. He also had evidence of portal vein thrombosis for which he underwent ultrasound. Finally, he underwent diagnostic and therapeutic paracentesis and fluid studies were sent. He was deemed stable for discharge after his paracentesis, and was discharged with instructions to follow up with his PCP for further care and management.    #Alcoholic cirrhosis of liver with ascites.  Plan: Recently diagnosed cirrhosis about 5 weeks ago with PCP Dr. Danny Gould, s/p first paracentesis at [facility] 1 month ago here with increasing abd pain and abdominal girth. Elevated direct BR-c/w cirrhosis. Imaging consistent with portal hypertension with large ascites, mild splenomegaly and paraesophageal varices. [id number] ALT 47, consistent with alcoholic transaminase.   -IR guided diagnostic/therapeutic paracentesis today  -Follow up cytology report from paracentesis  -as per records obtained from [facility] patient is no longer a candidate for liver transplant due to continued drinking and poor follow up.     #Liver mass.  Plan: On CTAP 3.8 cm centrally calcified liver mass, concerning for hepatocellular carcinoma.   -as per records obtained from [place], this mass is consistent with HCC on MRI, and is unresectable.    #Portal vein thrombosis.  Plan: On CT, patient has evidence of portal vein thrombosis.  -will obtain doppler US to further workup.     #Anemia.  Plan: Hg 8.5, HCT 27.5 on admission, with no known baseline. Patient denies any signs of active bleeding (no melena, no hematochezia, no BRBPR). He is asymptomatic currently, but will closely monitor given paraseophageal varices.   -no signs of active bleed, patient is not tachycardic or hypotensive so no clinical suspicion for active bleed.    #Hypertension.  Plan: Patient on Lisinopril 10mg at home for hypertension.  -held here in light of newly diagnosed cirrhosis and ascites.    Patient is a 60 year old male with medical history significant for HTN, GERD, alcohol abuse, heroin abuse, active hepatitis C, and recently diagnosed cirrhosis here with a two day history of severe RLQ abd pain, nausea, and increased abdominal girth. Patient is a very poor historian, but he notes that he started to experience abdominal discomfort, and increasing size of his abdomen about 5-6 weeks ago, and at that time went to see PCP Dr. Danny Gould in the [place], when he was told that he has cirrhosis and ascites (water in his abdomen). He then went to [facility] for paracentesis and further management of his ascites and presumable workup of Cirrhosis. As per records obtained from [place], he was diagnosed with unresectable HCC and has active HCV for which he has failed therapy. He denies shortness of breath, chest pain, fevers, vomiting, melena, hematochezia, BRBPR, urinary symptoms, lightheadedness, dizziness. In the ED, he underwent CT AP that demonstrated cirrhosis, para-esophageal varices, and a 3.8 cm liver mass. He also had evidence of portal vein thrombosis for which he underwent ultrasound. Finally, he underwent diagnostic and therapeutic paracentesis. Cytologic evaluation of the ascitic fluid ruled out SBP. He was deemed stable for discharge after his paracentesis. He was discharged with instructions to continue taking his Lasix and spironolactone and follow up with his PCP for further care and management.    #Alcoholic cirrhosis of liver with ascites.  Plan: Recently diagnosed cirrhosis about 5 weeks ago with PCP Dr. Danny Gould, s/p first paracentesis at [facility] 1 month ago here with increasing abd pain and abdominal girth. Elevated direct BR-c/w cirrhosis. Imaging consistent with portal hypertension with large ascites, mild splenomegaly and paraesophageal varices. AST 86 ALT 36, consistent with alcoholic transaminase.   -IR guided diagnostic/therapeutic paracentesis - 4700cc of yellow, clear fluid drained  -Cytology ruled out SBP  -as per records obtained from [facility] patient is no longer a candidate for liver transplant due to continued drinking and poor follow up.     #Liver mass.  Plan: On CTAP 3.8 cm centrally calcified liver mass, concerning for hepatocellular carcinoma.   -as per records obtained from [place], this mass is consistent with HCC on MRI, and is unresectable.  -AFP elevation noted (1461)    #Portal vein thrombosis.  Plan: On CT, patient has evidence of portal vein thrombosis.  -consider doppler US for further workup.     #Anemia.  Plan: Hg 8.5, HCT 27.5 on admission, with no known baseline. Patient denies any signs of active bleeding (no melena, no hematochezia, no BRBPR). He is asymptomatic currently, but will closely monitor given paraseophageal varices.   -no signs of active bleed, patient is not tachycardic or hypotensive so no clinical suspicion for active bleed.    #Hypertension.  Plan: Patient on Lisinopril 10mg at home for hypertension.  -held here in light of newly diagnosed cirrhosis and ascites.    Patient is a 60 year old male with medical history significant for HTN, GERD, alcohol abuse, heroin abuse, active hepatitis C, and recently diagnosed cirrhosis here with a two day history of severe RLQ abd pain, nausea, and increased abdominal girth. Patient is a very poor historian, but he notes that he started to experience abdominal discomfort, and increasing size of his abdomen about 5-6 weeks ago, and at that time went to see PCP Dr. Danny Gould in the [place], when he was told that he has cirrhosis and ascites (water in his abdomen). He then went to [facility] for paracentesis and further management of his ascites and presumable workup of Cirrhosis. As per records obtained from [place], he was diagnosed with unresectable HCC and has active HCV for which he has failed therapy. He denies shortness of breath, chest pain, fevers, vomiting, melena, hematochezia, BRBPR, urinary symptoms, lightheadedness, dizziness. In the ED, he underwent CT AP that demonstrated cirrhosis, para-esophageal varices, and a 3.8 cm liver mass. He also had evidence of portal vein thrombosis for which he underwent ultrasound. Finally, he underwent diagnostic and therapeutic paracentesis. Cytologic evaluation of the ascitic fluid ruled out SBP. He was deemed stable for discharge after his paracentesis. He was discharged with instructions to continue taking his Lasix and spironolactone and follow up with his PCP for further care and management.    #Alcoholic cirrhosis of liver with ascites.  Plan: Recently diagnosed cirrhosis about 5 weeks ago with PCP Dr. Danny Gould, s/p first paracentesis at [facility] 1 month ago here with increasing abd pain and abdominal girth. Elevated direct BR-c/w cirrhosis. Imaging consistent with portal hypertension with large ascites, mild splenomegaly and paraesophageal varices. AST 86 ALT 36, consistent with alcoholic transaminase.   -IR guided diagnostic/therapeutic paracentesis - 4700cc of yellow, clear fluid drained  -Cytology ruled out SBP  -as per records obtained from [facility] patient is no longer a candidate for liver transplant due to continued drinking and poor follow up.   -Patient can follow-up with Dr. Brent Roland at [facility] for further management or GI team at [facility]     #Liver mass.  Plan: On CTAP 3.8 cm centrally calcified liver mass, concerning for hepatocellular carcinoma.   -as per records obtained from [place], this mass is consistent with HCC on MRI, and is unresectable.  -AFP elevation noted (1461)    #Portal vein thrombosis.  Plan: On CT, patient has evidence of portal vein thrombosis.  -Patient can have outpatient imaging and appropriate GI follow-up.     #Anemia.  Plan: Hg 8.5, HCT 27.5 on admission, with no known baseline. Patient denies any signs of active bleeding (no melena, no hematochezia, no BRBPR). He is asymptomatic currently, but will closely monitor given paraseophageal varices.   -no signs of active bleed, patient is not tachycardic or hypotensive so no clinical suspicion for active bleed.  -GI outpatient for further work-up- serial EGDs for monitoring vs B blocker.     #Hypertension.  Plan: Patient on Lisinopril 10mg at home for hypertension.  -held here in light of newly diagnosed cirrhosis and ascites.   -Patient can monitor BP at home and follow-up with PCP for BP management

## 2020-11-23 NOTE — H&P ADULT - PROBLEM SELECTOR PLAN 1
Recently diagnosed cirrhosis about 5 weeks ago with PCP Dr. Danny Gould, s/p first paracentesis at [facility] 1 month ago here with increasing abd pain and abdominal girth. Elevated direct BR-c/w cirrhosis. Imaging consistent with portal hypertension with large ascites, mild splenomegaly and paraesophageal varices.  -IR guided diagnostic/therapeutic paracentesis today  -Follow up cytology report from paracentesis  -GI on board, follow up recs Recently diagnosed cirrhosis about 5 weeks ago with PCP Dr. Danny Gould, s/p first paracentesis at [facility] 1 month ago here with increasing abd pain and abdominal girth. Elevated direct BR-c/w cirrhosis. Imaging consistent with portal hypertension with large ascites, mild splenomegaly and paraesophageal varices. [id number] ALT 47, consistent with alcoholic transaminase.   -IR guided diagnostic/therapeutic paracentesis today  -Follow up cytology report from paracentesis  -GI on board, follow up recs Recently diagnosed cirrhosis about 5 weeks ago with PCP Dr. Danny Gould, s/p first paracentesis at [facility] 1 month ago here with increasing abd pain and abdominal girth. Elevated direct BR-c/w cirrhosis. Imaging consistent with portal hypertension with large ascites, mild splenomegaly and paraesophageal varices. [id number] ALT 47, consistent with alcoholic transaminase.   -IR guided diagnostic/therapeutic paracentesis today  -Follow up cytology report from paracentesis  -GI on board, follow up recs  -will start patient on lasix 40mg and spironolactone 50 daily tomorrow if BP remains stable after paracentesis.

## 2020-11-24 VITALS
OXYGEN SATURATION: 99 % | RESPIRATION RATE: 20 BRPM | SYSTOLIC BLOOD PRESSURE: 133 MMHG | TEMPERATURE: 98 F | HEART RATE: 95 BPM | DIASTOLIC BLOOD PRESSURE: 76 MMHG

## 2020-11-24 DIAGNOSIS — D69.6 THROMBOCYTOPENIA, UNSPECIFIED: ICD-10-CM

## 2020-11-24 DIAGNOSIS — D72.810 LYMPHOCYTOPENIA: ICD-10-CM

## 2020-11-24 LAB
ALBUMIN FLD-MCNC: 0.3 G/DL — SIGNIFICANT CHANGE UP
ALBUMIN SERPL ELPH-MCNC: 2.8 G/DL — LOW (ref 3.3–5)
ALP SERPL-CCNC: 123 U/L — HIGH (ref 40–120)
ALT FLD-CCNC: 36 U/L — SIGNIFICANT CHANGE UP (ref 10–45)
ANION GAP SERPL CALC-SCNC: 10 MMOL/L — SIGNIFICANT CHANGE UP (ref 5–17)
APTT BLD: 33.2 SEC — SIGNIFICANT CHANGE UP (ref 27.5–35.5)
AST SERPL-CCNC: 86 U/L — HIGH (ref 10–40)
B PERT IGG+IGM PNL SER: SIGNIFICANT CHANGE UP
BASOPHILS # BLD AUTO: 0.01 K/UL — SIGNIFICANT CHANGE UP (ref 0–0.2)
BASOPHILS NFR BLD AUTO: 0.4 % — SIGNIFICANT CHANGE UP (ref 0–2)
BILIRUB SERPL-MCNC: 1.5 MG/DL — HIGH (ref 0.2–1.2)
BUN SERPL-MCNC: 13 MG/DL — SIGNIFICANT CHANGE UP (ref 7–23)
CALCIUM SERPL-MCNC: 7.8 MG/DL — LOW (ref 8.4–10.5)
CHLORIDE SERPL-SCNC: 103 MMOL/L — SIGNIFICANT CHANGE UP (ref 96–108)
CO2 SERPL-SCNC: 24 MMOL/L — SIGNIFICANT CHANGE UP (ref 22–31)
COLOR FLD: SIGNIFICANT CHANGE UP
COMMENT - FLUIDS: SIGNIFICANT CHANGE UP
CREAT SERPL-MCNC: 0.7 MG/DL — SIGNIFICANT CHANGE UP (ref 0.5–1.3)
CULTURE RESULTS: SIGNIFICANT CHANGE UP
EOSINOPHIL # BLD AUTO: 0.23 K/UL — SIGNIFICANT CHANGE UP (ref 0–0.5)
EOSINOPHIL NFR BLD AUTO: 8.2 % — HIGH (ref 0–6)
FERRITIN SERPL-MCNC: 22 NG/ML — LOW (ref 30–400)
FLUID INTAKE SUBSTANCE CLASS: SIGNIFICANT CHANGE UP
FLUID SEGMENTED GRANULOCYTES: 1 % — SIGNIFICANT CHANGE UP
GLUCOSE FLD-MCNC: 109 MG/DL — SIGNIFICANT CHANGE UP
GLUCOSE SERPL-MCNC: 77 MG/DL — SIGNIFICANT CHANGE UP (ref 70–99)
HCT VFR BLD CALC: 25 % — LOW (ref 39–50)
HCV AB S/CO SERPL IA: 17.45 S/CO — SIGNIFICANT CHANGE UP
HCV AB SERPL-IMP: REACTIVE
HGB BLD-MCNC: 7.6 G/DL — LOW (ref 13–17)
IMM GRANULOCYTES NFR BLD AUTO: 0.4 % — SIGNIFICANT CHANGE UP (ref 0–1.5)
INR BLD: 1.35 — HIGH (ref 0.88–1.16)
IRON SATN MFR SERPL: 25 UG/DL — LOW (ref 45–165)
IRON SATN MFR SERPL: 8 % — LOW (ref 16–55)
LDH SERPL L TO P-CCNC: 33 U/L — SIGNIFICANT CHANGE UP
LYMPHOCYTES # BLD AUTO: 0.45 K/UL — LOW (ref 1–3.3)
LYMPHOCYTES # BLD AUTO: 16.1 % — SIGNIFICANT CHANGE UP (ref 13–44)
LYMPHOCYTES # FLD: 27 % — SIGNIFICANT CHANGE UP
MAGNESIUM SERPL-MCNC: 1.7 MG/DL — SIGNIFICANT CHANGE UP (ref 1.6–2.6)
MCHC RBC-ENTMCNC: 22.8 PG — LOW (ref 27–34)
MCHC RBC-ENTMCNC: 30.4 GM/DL — LOW (ref 32–36)
MCV RBC AUTO: 75.1 FL — LOW (ref 80–100)
MESOTHL CELL # FLD: 1 % — SIGNIFICANT CHANGE UP
MONOCYTES # BLD AUTO: 0.41 K/UL — SIGNIFICANT CHANGE UP (ref 0–0.9)
MONOCYTES NFR BLD AUTO: 14.6 % — HIGH (ref 2–14)
MONOS+MACROS # FLD: 51 % — SIGNIFICANT CHANGE UP
NEUTROPHILS # BLD AUTO: 1.69 K/UL — LOW (ref 1.8–7.4)
NEUTROPHILS NFR BLD AUTO: 60.3 % — SIGNIFICANT CHANGE UP (ref 43–77)
NRBC # BLD: 0 /100 WBCS — SIGNIFICANT CHANGE UP (ref 0–0)
PLATELET # BLD AUTO: 53 K/UL — LOW (ref 150–400)
POTASSIUM SERPL-MCNC: 4 MMOL/L — SIGNIFICANT CHANGE UP (ref 3.5–5.3)
POTASSIUM SERPL-SCNC: 4 MMOL/L — SIGNIFICANT CHANGE UP (ref 3.5–5.3)
PROT FLD-MCNC: 0.5 G/DL — SIGNIFICANT CHANGE UP
PROT SERPL-MCNC: 6.5 G/DL — SIGNIFICANT CHANGE UP (ref 6–8.3)
PROTHROM AB SERPL-ACNC: 16 SEC — HIGH (ref 10.6–13.6)
RBC # BLD: 3.33 M/UL — LOW (ref 4.2–5.8)
RBC # FLD: 21.2 % — HIGH (ref 10.3–14.5)
RCV VOL RI: 1000 /UL — HIGH (ref 0–0)
SODIUM SERPL-SCNC: 137 MMOL/L — SIGNIFICANT CHANGE UP (ref 135–145)
SPECIMEN SOURCE FLD: SIGNIFICANT CHANGE UP
SPECIMEN SOURCE: SIGNIFICANT CHANGE UP
TIBC SERPL-MCNC: 333 UG/DL — SIGNIFICANT CHANGE UP (ref 220–430)
TOTAL NUCLEATED CELL COUNT, BODY FLUID: 80 /UL — SIGNIFICANT CHANGE UP
TRANSFERRIN SERPL-MCNC: 259 MG/DL — SIGNIFICANT CHANGE UP (ref 200–360)
TUBE TYPE: SIGNIFICANT CHANGE UP
UIBC SERPL-MCNC: 308 UG/DL — SIGNIFICANT CHANGE UP (ref 110–370)
WBC # BLD: 2.8 K/UL — LOW (ref 3.8–10.5)
WBC # FLD AUTO: 2.8 K/UL — LOW (ref 3.8–10.5)

## 2020-11-24 PROCEDURE — 83615 LACTATE (LD) (LDH) ENZYME: CPT

## 2020-11-24 PROCEDURE — 84466 ASSAY OF TRANSFERRIN: CPT

## 2020-11-24 PROCEDURE — 82728 ASSAY OF FERRITIN: CPT

## 2020-11-24 PROCEDURE — U0003: CPT

## 2020-11-24 PROCEDURE — 96376 TX/PRO/DX INJ SAME DRUG ADON: CPT

## 2020-11-24 PROCEDURE — 82248 BILIRUBIN DIRECT: CPT

## 2020-11-24 PROCEDURE — 87070 CULTURE OTHR SPECIMN AEROBIC: CPT

## 2020-11-24 PROCEDURE — 88305 TISSUE EXAM BY PATHOLOGIST: CPT

## 2020-11-24 PROCEDURE — 82140 ASSAY OF AMMONIA: CPT

## 2020-11-24 PROCEDURE — 81001 URINALYSIS AUTO W/SCOPE: CPT

## 2020-11-24 PROCEDURE — 96375 TX/PRO/DX INJ NEW DRUG ADDON: CPT

## 2020-11-24 PROCEDURE — 87086 URINE CULTURE/COLONY COUNT: CPT

## 2020-11-24 PROCEDURE — 80053 COMPREHEN METABOLIC PANEL: CPT

## 2020-11-24 PROCEDURE — 74177 CT ABD & PELVIS W/CONTRAST: CPT

## 2020-11-24 PROCEDURE — 49083 ABD PARACENTESIS W/IMAGING: CPT

## 2020-11-24 PROCEDURE — 86803 HEPATITIS C AB TEST: CPT

## 2020-11-24 PROCEDURE — 84157 ASSAY OF PROTEIN OTHER: CPT

## 2020-11-24 PROCEDURE — 87205 SMEAR GRAM STAIN: CPT

## 2020-11-24 PROCEDURE — P9047: CPT

## 2020-11-24 PROCEDURE — 82105 ALPHA-FETOPROTEIN SERUM: CPT

## 2020-11-24 PROCEDURE — 87075 CULTR BACTERIA EXCEPT BLOOD: CPT

## 2020-11-24 PROCEDURE — 88305 TISSUE EXAM BY PATHOLOGIST: CPT | Mod: 26

## 2020-11-24 PROCEDURE — 83605 ASSAY OF LACTIC ACID: CPT

## 2020-11-24 PROCEDURE — 99285 EMERGENCY DEPT VISIT HI MDM: CPT | Mod: 25

## 2020-11-24 PROCEDURE — 74019 RADEX ABDOMEN 2 VIEWS: CPT

## 2020-11-24 PROCEDURE — 99239 HOSP IP/OBS DSCHRG MGMT >30: CPT | Mod: GC

## 2020-11-24 PROCEDURE — 36415 COLL VENOUS BLD VENIPUNCTURE: CPT

## 2020-11-24 PROCEDURE — 88112 CYTOPATH CELL ENHANCE TECH: CPT

## 2020-11-24 PROCEDURE — 89051 BODY FLUID CELL COUNT: CPT

## 2020-11-24 PROCEDURE — 76705 ECHO EXAM OF ABDOMEN: CPT

## 2020-11-24 PROCEDURE — 74019 RADEX ABDOMEN 2 VIEWS: CPT | Mod: 26,76

## 2020-11-24 PROCEDURE — 74018 RADEX ABDOMEN 1 VIEW: CPT

## 2020-11-24 PROCEDURE — 83540 ASSAY OF IRON: CPT

## 2020-11-24 PROCEDURE — 87521 HEPATITIS C PROBE&RVRS TRNSC: CPT

## 2020-11-24 PROCEDURE — 83690 ASSAY OF LIPASE: CPT

## 2020-11-24 PROCEDURE — 85610 PROTHROMBIN TIME: CPT

## 2020-11-24 PROCEDURE — 82247 BILIRUBIN TOTAL: CPT

## 2020-11-24 PROCEDURE — 72100 X-RAY EXAM L-S SPINE 2/3 VWS: CPT

## 2020-11-24 PROCEDURE — 82042 OTHER SOURCE ALBUMIN QUAN EA: CPT

## 2020-11-24 PROCEDURE — 85730 THROMBOPLASTIN TIME PARTIAL: CPT

## 2020-11-24 PROCEDURE — 83735 ASSAY OF MAGNESIUM: CPT

## 2020-11-24 PROCEDURE — 88112 CYTOPATH CELL ENHANCE TECH: CPT | Mod: 26

## 2020-11-24 PROCEDURE — 82945 GLUCOSE OTHER FLUID: CPT

## 2020-11-24 PROCEDURE — 83550 IRON BINDING TEST: CPT

## 2020-11-24 PROCEDURE — 85025 COMPLETE CBC W/AUTO DIFF WBC: CPT

## 2020-11-24 PROCEDURE — 96374 THER/PROPH/DIAG INJ IV PUSH: CPT | Mod: XU

## 2020-11-24 RX ORDER — OXYCODONE HYDROCHLORIDE 5 MG/1
5 TABLET ORAL ONCE
Refills: 0 | Status: DISCONTINUED | OUTPATIENT
Start: 2020-11-24 | End: 2020-11-24

## 2020-11-24 RX ORDER — FUROSEMIDE 40 MG
1 TABLET ORAL
Qty: 0 | Refills: 0 | DISCHARGE

## 2020-11-24 RX ORDER — ESCITALOPRAM OXALATE 10 MG/1
10 TABLET, FILM COATED ORAL EVERY 24 HOURS
Refills: 0 | Status: DISCONTINUED | OUTPATIENT
Start: 2020-11-24 | End: 2020-11-24

## 2020-11-24 RX ORDER — FOLIC ACID 0.8 MG
1 TABLET ORAL
Qty: 0 | Refills: 0 | DISCHARGE

## 2020-11-24 RX ORDER — TRAMADOL HYDROCHLORIDE 50 MG/1
25 TABLET ORAL EVERY 6 HOURS
Refills: 0 | Status: DISCONTINUED | OUTPATIENT
Start: 2020-11-24 | End: 2020-11-24

## 2020-11-24 RX ORDER — HYDROMORPHONE HYDROCHLORIDE 2 MG/ML
0.5 INJECTION INTRAMUSCULAR; INTRAVENOUS; SUBCUTANEOUS ONCE
Refills: 0 | Status: DISCONTINUED | OUTPATIENT
Start: 2020-11-24 | End: 2020-11-24

## 2020-11-24 RX ORDER — FUROSEMIDE 40 MG
40 TABLET ORAL DAILY
Refills: 0 | Status: DISCONTINUED | OUTPATIENT
Start: 2020-11-24 | End: 2020-11-24

## 2020-11-24 RX ORDER — LANOLIN ALCOHOL/MO/W.PET/CERES
5 CREAM (GRAM) TOPICAL AT BEDTIME
Refills: 0 | Status: DISCONTINUED | OUTPATIENT
Start: 2020-11-24 | End: 2020-11-24

## 2020-11-24 RX ORDER — LISINOPRIL 2.5 MG/1
1 TABLET ORAL
Qty: 0 | Refills: 0 | DISCHARGE

## 2020-11-24 RX ORDER — FERROUS SULFATE 325(65) MG
1 TABLET ORAL
Qty: 30 | Refills: 0
Start: 2020-11-24

## 2020-11-24 RX ORDER — FERROUS SULFATE 325(65) MG
325 TABLET ORAL DAILY
Refills: 0 | Status: DISCONTINUED | OUTPATIENT
Start: 2020-11-24 | End: 2020-11-24

## 2020-11-24 RX ORDER — ESCITALOPRAM OXALATE 10 MG/1
2 TABLET, FILM COATED ORAL
Qty: 0 | Refills: 0 | DISCHARGE

## 2020-11-24 RX ORDER — SPIRONOLACTONE 25 MG/1
0 TABLET, FILM COATED ORAL
Qty: 0 | Refills: 0 | DISCHARGE

## 2020-11-24 RX ADMIN — Medication 1 MILLIGRAM(S): at 12:17

## 2020-11-24 RX ADMIN — HYDROMORPHONE HYDROCHLORIDE 0.5 MILLIGRAM(S): 2 INJECTION INTRAMUSCULAR; INTRAVENOUS; SUBCUTANEOUS at 04:39

## 2020-11-24 RX ADMIN — Medication 1 TABLET(S): at 12:17

## 2020-11-24 RX ADMIN — TRAMADOL HYDROCHLORIDE 25 MILLIGRAM(S): 50 TABLET ORAL at 09:22

## 2020-11-24 RX ADMIN — OXYCODONE HYDROCHLORIDE 5 MILLIGRAM(S): 5 TABLET ORAL at 04:49

## 2020-11-24 RX ADMIN — TRAMADOL HYDROCHLORIDE 25 MILLIGRAM(S): 50 TABLET ORAL at 10:22

## 2020-11-24 RX ADMIN — OXYCODONE HYDROCHLORIDE 5 MILLIGRAM(S): 5 TABLET ORAL at 01:26

## 2020-11-24 RX ADMIN — HYDROMORPHONE HYDROCHLORIDE 0.5 MILLIGRAM(S): 2 INJECTION INTRAMUSCULAR; INTRAVENOUS; SUBCUTANEOUS at 04:04

## 2020-11-24 RX ADMIN — Medication 325 MILLIGRAM(S): at 12:17

## 2020-11-24 RX ADMIN — ESCITALOPRAM OXALATE 10 MILLIGRAM(S): 10 TABLET, FILM COATED ORAL at 12:17

## 2020-11-24 RX ADMIN — Medication 40 MILLIGRAM(S): at 09:23

## 2020-11-24 RX ADMIN — Medication 100 MILLIGRAM(S): at 12:17

## 2020-11-24 RX ADMIN — TRAMADOL HYDROCHLORIDE 50 MILLIGRAM(S): 50 TABLET ORAL at 00:48

## 2020-11-24 NOTE — CHART NOTE - NSCHARTNOTEFT_GEN_A_CORE
Patient eloped hospital at 3:15 PM, however discharge paperwork was completed but not reviewed with patient. Prior to patient eloping, provider had gone over which medications he would need to  from his pharmacy, and stressed the importance of cutting down on drinking. Medications have already been sent to his pharmacy (spironolactone and furosemide). Attempted to call patient and emergency contact brother 3 times each, however there was no response, so multiple voicemails were left in [place] explaining that patient needed to  his medications.

## 2020-11-24 NOTE — PROGRESS NOTE ADULT - PROBLEM SELECTOR PLAN 4
Hg 8.5, HCT 27.5 on admission, with no known baseline. Patient denies any signs of active bleeding (no melena, no hematochezia, no BRBPR). He is asymptomatic currently, but will closely monitor given paraseophageal varices.   -Follow up iron studies  -maintain active t and s  -transfuse < 7

## 2020-11-24 NOTE — PROGRESS NOTE ADULT - PROBLEM SELECTOR PLAN 7
Patient on Lisinopril 10mg at home for hypertension. Currently he is normotensive.   -Discontinue lisinopril 10mg on discharge in light of cirrhosis and ascites.  -as per records obtained from [place], patient was discharged on lasix 40 and spironolactone 50, will continue this on discharge.

## 2020-11-24 NOTE — PROGRESS NOTE ADULT - PROBLEM SELECTOR PLAN 1
Recently diagnosed cirrhosis about 5 weeks ago with PCP Dr. Danny Gould, s/p first paracentesis at [facility] 1 month ago here with increasing abd pain and abdominal girth. Elevated direct BR-c/w cirrhosis. Imaging consistent with portal hypertension with large ascites, mild splenomegaly and paraesophageal varices. [id number] ALT 47, consistent with alcoholic transaminase.   -IR guided diagnostic/therapeutic paracentesis today  -Follow up cytology report from paracentesis  -GI on board, follow up recs  -will start patient on lasix 40mg and spironolactone 50 daily tomorrow if BP remains stable after paracentesis. Recently diagnosed cirrhosis about 5 weeks ago with PCP Dr. Danny Gould, s/p first paracentesis at [facility] 1 month ago here with increasing abd pain and abdominal girth. Elevated direct BR-c/w cirrhosis. Imaging consistent with portal hypertension with large ascites, mild splenomegaly and paraesophageal varices. AST 86 ALT 36, consistent with alcoholic transaminase.   -IR performed paracentesis today - 4700cc yellow clear fluid  -Follow up cytology report from paracentesis to r/o SBP  -GI on board, follow up recs  -Will start patient on lasix 40mg and spironolactone 50 daily, BP currently stable.  -MELD 14, Child-Espino B  -HCV reactive, h/o IVDU

## 2020-11-24 NOTE — PROGRESS NOTE ADULT - SUBJECTIVE AND OBJECTIVE BOX
GASTROENTEROLOGY PROGRESS NOTE  Patient seen and examined at bedside. Patient complaining of continued abdominal pain. Discussed his history at [facility] and patient states he is no longer happy with his care there and would like to transfer care to [facility]. Told patient he will need to obtain his complete records and bring them with him to an outpatient visit. Denies nausea, vomiting, fevers, chills.    PERTINENT REVIEW OF SYSTEMS:  CONSTITUTIONAL: No weakness, fevers or chills  HEENT: No visual changes; No vertigo or throat pain   GASTROINTESTINAL: As above  NEUROLOGICAL: No numbness or weakness  SKIN: No itching, burning, rashes, or lesions     Allergies    No Known Allergies    Intolerances      MEDICATIONS:  MEDICATIONS  (STANDING):  escitalopram 10 milliGRAM(s) Oral every 24 hours  ferrous    sulfate 325 milliGRAM(s) Oral daily  folic acid 1 milliGRAM(s) Oral daily  furosemide    Tablet 40 milliGRAM(s) Oral daily  melatonin 5 milliGRAM(s) Oral at bedtime  multivitamin 1 Tablet(s) Oral daily  thiamine 100 milliGRAM(s) Oral daily    MEDICATIONS  (PRN):  LORazepam   Injectable 2 milliGRAM(s) IV Push every 2 hours PRN For CIWA >8  traMADol 25 milliGRAM(s) Oral every 6 hours PRN Moderate Pain (4 - 6)    Vital Signs Last 24 Hrs  T(C): 36.7 (24 Nov 2020 12:23), Max: 37.6 (23 Nov 2020 21:46)  T(F): 98.1 (24 Nov 2020 12:23), Max: 99.6 (23 Nov 2020 21:46)  HR: 95 (24 Nov 2020 12:23) (84 - 100)  BP: 133/76 (24 Nov 2020 12:23) (118/54 - 154/80)  BP(mean): --  RR: 20 (24 Nov 2020 12:23) (18 - 20)  SpO2: 99% (24 Nov 2020 12:23) (98% - 99%)    PHYSICAL EXAM:    General: Well developed; well nourished; in no acute distress  HEENT: MMM, conjunctiva and sclera clear  Gastrointestinal: Soft non-tender non-distended; Normal bowel sounds; No hepatosplenomegaly. No rebound or guarding  Skin: Warm and dry. No obvious rash    LABS:                        7.6    2.80  )-----------( 53       ( 24 Nov 2020 07:03 )             25.0     11-24    137  |  103  |  13  ----------------------------<  77  4.0   |  24  |  0.70    Ca    7.8<L>      24 Nov 2020 07:03  Mg     1.7     11-24    TPro  6.5  /  Alb  2.8<L>  /  TBili  1.5<H>  /  DBili  x   /  AST  86<H>  /  ALT  36  /  AlkPhos  123<H>  11-24    PT/INR - ( 24 Nov 2020 07:03 )   PT: 16.0 sec;   INR: 1.35          PTT - ( 24 Nov 2020 07:03 )  PTT:33.2 sec      Urinalysis Basic - ( 23 Nov 2020 06:39 )    Color: Yellow / Appearance: Clear / SG: >=1.030 / pH: x  Gluc: x / Ketone: Trace mg/dL  / Bili: Small / Urobili: 2.0 E.U./dL   Blood: x / Protein: Trace mg/dL / Nitrite: POSITIVE   Leuk Esterase: NEGATIVE / RBC: < 5 /HPF / WBC < 5 /HPF   Sq Epi: x / Non Sq Epi: 5-10 /HPF / Bacteria: Present /HPF                Culture - Body Fluid with Gram Stain (collected 23 Nov 2020 17:20)  Source: .Body Fluid ascites  Gram Stain (23 Nov 2020 19:02):    No organisms seen    Rare WBC's  Preliminary Report (24 Nov 2020 08:27):    No growth to date    Culture - Urine (collected 23 Nov 2020 09:02)  Source: .Urine Clean Catch (Midstream)  Final Report (24 Nov 2020 11:20):    >100,000 CFU/ml [place] omnicolens      RADIOLOGY & ADDITIONAL STUDIES:  Reviewed

## 2020-11-24 NOTE — PROGRESS NOTE ADULT - PROBLEM SELECTOR PLAN 2
On CTAP 3.8 cm centrally calcified liver mass, concerning for hepatocellular carcinoma.   -Follow up AFP  -Will order CT Triple phase for further workup of mass  -Follow up GI recommendations On CTAP 3.8 cm centrally calcified liver mass, collateral [place] confirmed unresectable hepatocellular carcinoma.  -AFP 1461 (increased from 345 on 10/4)

## 2020-11-24 NOTE — PROGRESS NOTE ADULT - SUBJECTIVE AND OBJECTIVE BOX
**Incomplete Note**  OVERNIGHT EVENTS:    SUBJECTIVE / INTERVAL HPI: Patient seen and examined at bedside.     VITAL SIGNS:  Vital Signs Last 24 Hrs  T(C): 37 (24 Nov 2020 06:11), Max: 37.6 (23 Nov 2020 21:46)  T(F): 98.6 (24 Nov 2020 06:11), Max: 99.6 (23 Nov 2020 21:46)  HR: 84 (24 Nov 2020 06:11) (84 - 100)  BP: 118/54 (24 Nov 2020 06:11) (118/54 - 154/80)  BP(mean): --  RR: 19 (24 Nov 2020 06:11) (16 - 19)  SpO2: 99% (24 Nov 2020 06:11) (97% - 99%)      PHYSICAL EXAM:    General: WDWN  HEENT: NC/AT; PERRL, anicteric sclera; MMM  Neck: supple  Cardiovascular: +S1/S2; RRR  Respiratory: CTA B/L; no W/R/R  Gastrointestinal: soft, NT/ND; +BSx4  Extremities: WWP; no edema, clubbing or cyanosis  Vascular: 2+ radial, DP/PT pulses B/L  Neurological: AAOx3; no focal deficits    MEDICATIONS:  MEDICATIONS  (STANDING):  folic acid 1 milliGRAM(s) Oral daily  multivitamin 1 Tablet(s) Oral daily  thiamine 100 milliGRAM(s) Oral daily    MEDICATIONS  (PRN):  LORazepam   Injectable 2 milliGRAM(s) IV Push every 2 hours PRN For CIWA >8      ALLERGIES:  Allergies    No Known Allergies    Intolerances        LABS:                        8.5    3.98  )-----------( 66       ( 23 Nov 2020 06:10 )             27.5     11-23    136  |  100  |  14  ----------------------------<  114<H>  3.6   |  27  |  0.75    Ca    8.3<L>      23 Nov 2020 06:10    TPro  7.2  /  Alb  2.8<L>  /  TBili  1.7<H>  /  DBili  0.8<H>  /  AST  111<H>  /  ALT  47<H>  /  AlkPhos  159<H>  11-23    PT/INR - ( 23 Nov 2020 06:10 )   PT: 15.2 sec;   INR: 1.28          PTT - ( 23 Nov 2020 06:10 )  PTT:30.4 sec  Urinalysis Basic - ( 23 Nov 2020 06:39 )    Color: Yellow / Appearance: Clear / SG: >=1.030 / pH: x  Gluc: x / Ketone: Trace mg/dL  / Bili: Small / Urobili: 2.0 E.U./dL   Blood: x / Protein: Trace mg/dL / Nitrite: POSITIVE   Leuk Esterase: NEGATIVE / RBC: < 5 /HPF / WBC < 5 /HPF   Sq Epi: x / Non Sq Epi: 5-10 /HPF / Bacteria: Present /HPF      CAPILLARY BLOOD GLUCOSE          Culture - Body Fluid with Gram Stain (collected 11-23-20 @ 17:20)  Source: .Body Fluid ascites  Gram Stain (11-23-20 @ 19:02):    No organisms seen    Rare WBC's        RADIOLOGY & ADDITIONAL TESTS: Reviewed.    ASSESSMENT:    PLAN:  OVERNIGHT EVENTS: TAMICA    SUBJECTIVE / INTERVAL HPI: Patient seen and examined at bedside. Patient reports his RLQ pain is improved (now at 5/10 vs. 20/10 last night). Denies CP/SOB, f/c, n/v, melena, hematochezia, BRBPR, urinary symptoms, lightheadedness.    VITAL SIGNS:  Vital Signs Last 24 Hrs  T(C): 37 (24 Nov 2020 06:11), Max: 37.6 (23 Nov 2020 21:46)  T(F): 98.6 (24 Nov 2020 06:11), Max: 99.6 (23 Nov 2020 21:46)  HR: 84 (24 Nov 2020 06:11) (84 - 100)  BP: 118/54 (24 Nov 2020 06:11) (118/54 - 154/80)  BP(mean): --  RR: 19 (24 Nov 2020 06:11) (18 - 19)  SpO2: 99% (24 Nov 2020 06:11) (98% - 99%)      PHYSICAL EXAM:    General: WDWN  HEENT: +scleral icterus; NC/AT; PERRL; MMM  Neck: supple  Cardiovascular: +S1/S2; RRR  Respiratory: CTA B/L; no W/R/R  Gastrointestinal: Distended, tender to palpation in RLQ, RUQ, LLQ. Negative psoas/obturator signs.  Extremities: WWP; no edema, clubbing or cyanosis  Vascular: 2+ radial, DP/PT pulses B/L  Neurological: AAOx3; no focal deficits    MEDICATIONS:  MEDICATIONS  (STANDING):  escitalopram 10 milliGRAM(s) Oral every 24 hours  ferrous    sulfate 325 milliGRAM(s) Oral daily  folic acid 1 milliGRAM(s) Oral daily  furosemide    Tablet 40 milliGRAM(s) Oral daily  multivitamin 1 Tablet(s) Oral daily  thiamine 100 milliGRAM(s) Oral daily    MEDICATIONS  (PRN):  LORazepam   Injectable 2 milliGRAM(s) IV Push every 2 hours PRN For CIWA >8  traMADol 25 milliGRAM(s) Oral every 6 hours PRN Moderate Pain (4 - 6)        ALLERGIES:  Allergies - No Known Allergies  Intolerances    LABS:                                 7.6    2.80  )-----------( 53       ( 24 Nov 2020 07:03 )             25.0     11-24    137  |  103  |  13  ----------------------------<  77  4.0   |  24  |  0.70    Ca    7.8<L>      24 Nov 2020 07:03  Mg     1.7     11-24    TPro  6.5  /  Alb  2.8<L>  /  TBili  1.5<H>  /  DBili  x   /  AST  86<H>  /  ALT  36  /  AlkPhos  123<H>  11-24    PT/INR - ( 24 Nov 2020 07:03 )   PT: 16.0 sec;   INR: 1.35      PTT - ( 24 Nov 2020 07:03 )  PTT:33.2 sec    Hepatitis C Antibody Test (11.24.20 @ 07:03)   Hepatitis C Virus S/CO Ratio: 17.45 S/CO   Hepatitis C Virus Interpretation: Reactive: Hepatitis C AB     Paracentesis:    < from: IR Procedure. (11.23.20 @ 16:32) >  Procedure: Following informed consent the patient was placed in the supine position and the right lower quadrant prepped and draped in a sterile fashion. Physiologic monitoring was performed throughout the procedure. Under ultrasound guidance the peritoneal fluid collection wasaccessed with an 8 [place] multi-sideholed catheter. Approximately 4700 cc of clear yellow fluid was recovered. The catheter was removed.  The patient tolerated the procedure well and left the department in satisfactory condition. There were no immediate complications.    Findings:  There is a large amount of ascites.  A permanent US image of the needle and catheter within the fluid were recorded.    < end of copied text >      Culture - Body Fluid with Gram Stain (11.23.20 @ 17:20)   Gram Stain:   No organisms seen   Rare WBC's   Specimen Source: .Body Fluid ascites   Culture Results:   No growth to date     Imaging:    < from: CT Abdomen and Pelvis w/ Oral Cont and w/ IV Cont (11.23.20 @ 08:02) >  Impression:    1. Cirrhotic liver and evidence of portal hypertension with large ascites, mild splenomegaly and paraesophageal varices.    2. 3.8 cm centrally calcified liver mass, concerning for hepatocellular carcinoma. Recommend correlation with prior history and possible treatments. Recommend follow-up MRI.    3. Hypodense tubular structure in the anterior segment of the right hepatic lobe may represent thrombosed branch of portal vein or dilated bile duct. Recommend follow-up MRI.    4. Nodular enhancing structures in the retroperitoneal space representing lymphadenopathy or varicose veins.    < end of copied text >

## 2020-11-24 NOTE — PROGRESS NOTE ADULT - PROBLEM SELECTOR PLAN 5
Patient on Lisinopril 10mg at home for hypertension. Currently he is normotensive.   -please discontinue lisinopril 10mg on discharge in light of cirrhosis and ascites.  -as per records obtained from [place], patient was discharged on lasix 40 and spironolactone 50, will continue this on discharge. - platelet 53 this AM  - continue to monitor CBC

## 2020-11-24 NOTE — PROGRESS NOTE ADULT - ASSESSMENT
60yM w/HTN, GERD, EtOH and IVDU, recently diagnosed with cirrhosis, presenting with abdominal pain and distention. GI consulted for cirrhosis.    1. Decompensated cirrhosis (+ascites; -VH; -HE) - Etiology likely related to EtOH and IVDU. 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 Patient had been on the transplant list, but was de-listed in March of this year due to continued EtOH and IVDU. Patient reports now being on Suboxone. Patient also with known HCC with RFA in 2018. Not a        though patient unsure if he has had full evaluation before. Recently hospitalized at [facility] for similar complaint. Unsure if he has had an upper endoscopy in the past. Imaging at [facility] concerning for mass ascites and a ~3cm liver lesion with central calcification concerning for HCC. MELD-Na of 14; Child-Espino class B. Patient with recent alcohol cessation with AST:ALT in 2:1 pattern, concerning for alcoholic hepatitis, though MDF 12.3 indicative of good prognosis.  - Obtain collateral from [facility] regarding prior work-up  - Please obtain lipid panel, A1c, Iron studies, Ceruloplasmin, Alpha 1 Antitrypsin, Hepatitis A, B, C, and E serologies, VANE, ASMA, AMA, Quantitative IgG  - Perform diagnostic/therapeutic paracentesis with appropriate Albumin replacement - please send cell count and fluid studies to evaluate for SBP  - Per outpatient medication record, recently prescribed Lasix 40mg PO QD; Spironolactone 50mg PO QD - would continue  - Daily MELD labs - CMP and Coags  - Obtain MRI vs Triple Phase CT to evaluate liver lesion  - Add on AFP to admission labs    Recommendations discussed with primary team  Case discussed with attending physician    Patient with known history of decompensated EtOH/HCV cirrhosis (in relapse on Vesovi) +ascites, +EV, +HCC s/p RFA in 2018. Patient previously on transplant list, but de-listed in 03/2020 due to continued EtOH and IVDU (now on Suboxone). Patient admitted to [facility] in early October with abdominal distention, with 3.1L therapeutic paracentesis, noted to have rising AFP. Imaging at that time showing HCC with multiple lesions, the largest of which was ~4cm. Not a resection candidate, but following for possible localized TACE or Y90 followed by systemic therapy. Patient appears to have good follow-up and was seen as recently as 11/9 in the [facility] clinic. He should continue to follow-up with his current care team unless patient desires to obtain care elsewhere, but will require official transfer of records. 60yM w/HTN, GERD, EtOH and IVDU, recently diagnosed with cirrhosis, presenting with abdominal pain and distention. GI consulted for cirrhosis.    1. Decompensated cirrhosis (+ascites; -VH; -HE) - Etiology likely related to EtOH and IVDU. 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 Patient had been on the transplant list, but was de-listed in March of this year due to continued EtOH and IVDU. Patient reports now being on Suboxone. Patient also with known HCC with RFA in 2018. Not a viable transplant candidate or resection candidate. Had followed with [facility] who were planning either local Y90 or TACE, followed by systemic chemotherapy.  - Patient s/p paracentesis with albumin replacement  - Cell count not consistent with SBP; Albumin and Protein consistent with portal HTN  - Continue Lasix and Furosemide  - Patient with known small varices  - Encouraged patient to follow-up with [facility], however, if desires follow-up with [facility], please schedule for follow-up with Dr. Brent Roland at [phone]    Recommendations discussed with primary team  Case discussed with attending physician

## 2020-11-24 NOTE — PROGRESS NOTE ADULT - PROBLEM SELECTOR PLAN 6
F: none  E: replete as needed  N: will place patient on diet after IR procedure  DVT: none in light of anemia, SCD - WBC 2.80 this AM, 60.3% neutrophils, 16.1% lymphocytes, 14.6% monocytes (H), 8.2% eosinophils (H)  - continue to monitor CBC

## 2020-11-25 LAB — NON-GYNECOLOGICAL CYTOLOGY STUDY: SIGNIFICANT CHANGE UP

## 2020-11-28 LAB
CULTURE RESULTS: NO GROWTH — SIGNIFICANT CHANGE UP
SPECIMEN SOURCE: SIGNIFICANT CHANGE UP

## 2020-11-30 DIAGNOSIS — F10.10 ALCOHOL ABUSE, UNCOMPLICATED: ICD-10-CM

## 2020-11-30 DIAGNOSIS — R74.01 ELEVATION OF LEVELS OF LIVER TRANSAMINASE LEVELS: ICD-10-CM

## 2020-11-30 DIAGNOSIS — C22.0 LIVER CELL CARCINOMA: ICD-10-CM

## 2020-11-30 DIAGNOSIS — D64.9 ANEMIA, UNSPECIFIED: ICD-10-CM

## 2020-11-30 DIAGNOSIS — B17.10 ACUTE HEPATITIS C WITHOUT HEPATIC COMA: ICD-10-CM

## 2020-11-30 DIAGNOSIS — I85.10 SECONDARY ESOPHAGEAL VARICES WITHOUT BLEEDING: ICD-10-CM

## 2020-11-30 DIAGNOSIS — R16.1 SPLENOMEGALY, NOT ELSEWHERE CLASSIFIED: ICD-10-CM

## 2020-11-30 DIAGNOSIS — I81 PORTAL VEIN THROMBOSIS: ICD-10-CM

## 2020-11-30 DIAGNOSIS — I10 ESSENTIAL (PRIMARY) HYPERTENSION: ICD-10-CM

## 2020-11-30 DIAGNOSIS — F11.10 OPIOID ABUSE, UNCOMPLICATED: ICD-10-CM

## 2020-11-30 DIAGNOSIS — R10.33 PERIUMBILICAL PAIN: ICD-10-CM

## 2020-11-30 DIAGNOSIS — K70.31 ALCOHOLIC CIRRHOSIS OF LIVER WITH ASCITES: ICD-10-CM

## 2020-11-30 DIAGNOSIS — D69.6 THROMBOCYTOPENIA, UNSPECIFIED: ICD-10-CM

## 2020-11-30 DIAGNOSIS — K76.6 PORTAL HYPERTENSION: ICD-10-CM

## 2020-11-30 DIAGNOSIS — K21.9 GASTRO-ESOPHAGEAL REFLUX DISEASE WITHOUT ESOPHAGITIS: ICD-10-CM

## 2020-12-02 LAB — HCV RNA FLD QL NAA+PROBE: SIGNIFICANT CHANGE UP

## 2023-07-14 NOTE — ED ADULT TRIAGE NOTE - INTERNATIONAL TRAVEL
No 65yo M with PMH significant for anasarca who presented with complaint of worsening bilateral LE edema.    Pt seen and reports 100% intake of meals with No GI distress (nausea/vomiting/diarrhea/constipation.) at present. Continue with supplement as ordered. UBW- 172# per pt. Pt with skin ecchymosis, no edema per nursing flow sheet. Pt reports fluid related wt fluctuations all the time.

## 2024-09-17 NOTE — PATIENT PROFILE ADULT - VISION (WITH CORRECTIVE LENSES IF THE PATIENT USUALLY WEARS THEM):
Action 3: Continue Continue Regimen: Skyrizi 150 mg subcutaneous injection every 12 weeks. Normal vision: sees adequately in most situations; can see medication labels, newsprint Detail Level: Zone Otc Regimen: CeraVe lotion twice a day Plan: Pt advised to get done this week. Was due in August. Detail Level: Simple

## 2025-01-08 NOTE — H&P ADULT - NSTOBACCOEDUHP_GEN_A_NCS
no lesions,  no deformities,  no traumatic injuries,  no significant scars are present,  chest wall non-tender,  no masses present, breathing is unlabored without accessory muscle use, normal breath sounds 
Offered and patient declined
